# Patient Record
Sex: MALE | Race: WHITE | Employment: FULL TIME | URBAN - METROPOLITAN AREA
[De-identification: names, ages, dates, MRNs, and addresses within clinical notes are randomized per-mention and may not be internally consistent; named-entity substitution may affect disease eponyms.]

---

## 2019-05-23 ENCOUNTER — APPOINTMENT (OUTPATIENT)
Dept: GENERAL RADIOLOGY | Age: 34
End: 2019-05-23
Payer: COMMERCIAL

## 2019-05-23 ENCOUNTER — HOSPITAL ENCOUNTER (EMERGENCY)
Age: 34
Discharge: HOME | End: 2019-05-23
Attending: EMERGENCY MEDICINE
Payer: COMMERCIAL

## 2019-05-23 VITALS
TEMPERATURE: 98.2 F | BODY MASS INDEX: 49.44 KG/M2 | WEIGHT: 315 LBS | RESPIRATION RATE: 18 BRPM | HEART RATE: 94 BPM | SYSTOLIC BLOOD PRESSURE: 140 MMHG | OXYGEN SATURATION: 98 % | HEIGHT: 67 IN | DIASTOLIC BLOOD PRESSURE: 77 MMHG
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
## 2019-05-23 DIAGNOSIS — S16.1XXA STRAIN OF NECK MUSCLE, INITIAL ENCOUNTER: ICD-10-CM

## 2019-05-23 PROCEDURE — 99284 EMERGENCY DEPT VISIT MOD MDM: CPT

## 2019-05-23 PROCEDURE — 6360000002 HC RX W HCPCS: Performed by: NURSE PRACTITIONER

## 2019-05-23 PROCEDURE — 96372 THER/PROPH/DIAG INJ SC/IM: CPT

## 2019-05-23 PROCEDURE — 71046 X-RAY EXAM CHEST 2 VIEWS: CPT

## 2019-05-23 RX ORDER — CYCLOBENZAPRINE HCL 10 MG
10 TABLET ORAL 3 TIMES DAILY PRN
Qty: 30 TABLET | Refills: 0 | Status: SHIPPED | OUTPATIENT
Start: 2019-05-23 | End: 2019-06-02

## 2019-05-23 RX ORDER — IBUPROFEN 800 MG/1
800 TABLET ORAL EVERY 6 HOURS PRN
Qty: 21 TABLET | Refills: 0 | Status: SHIPPED | OUTPATIENT
Start: 2019-05-23

## 2019-05-23 RX ORDER — KETOROLAC TROMETHAMINE 30 MG/ML
60 INJECTION, SOLUTION INTRAMUSCULAR; INTRAVENOUS ONCE
Status: COMPLETED | OUTPATIENT
Start: 2019-05-23 | End: 2019-05-23

## 2019-05-23 RX ADMIN — KETOROLAC TROMETHAMINE 60 MG: 30 INJECTION, SOLUTION INTRAMUSCULAR at 12:28

## 2019-05-23 ASSESSMENT — PAIN SCALES - GENERAL
PAINLEVEL_OUTOF10: 5
PAINLEVEL_OUTOF10: 7

## 2019-05-23 ASSESSMENT — PAIN DESCRIPTION - ORIENTATION: ORIENTATION: LEFT

## 2019-05-23 ASSESSMENT — PAIN DESCRIPTION - PAIN TYPE: TYPE: ACUTE PAIN

## 2019-05-23 ASSESSMENT — PAIN DESCRIPTION - LOCATION: LOCATION: NECK;SHOULDER

## 2019-05-23 ASSESSMENT — PAIN DESCRIPTION - DESCRIPTORS: DESCRIPTORS: ACHING;SHARP;SHOOTING
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
## 2019-05-25 ASSESSMENT — ENCOUNTER SYMPTOMS
VOMITING: 0
ABDOMINAL PAIN: 0
COUGH: 0
CONSTIPATION: 0
WHEEZING: 0
RHINORRHEA: 0
SINUS PRESSURE: 0
COLOR CHANGE: 0
SHORTNESS OF BREATH: 0
NAUSEA: 0
DIARRHEA: 0
SORE THROAT: 0
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
## 2021-06-11 ENCOUNTER — APPOINTMENT (OUTPATIENT)
Dept: GENERAL RADIOLOGY | Facility: CLINIC | Age: 36
End: 2021-06-11
Payer: COMMERCIAL

## 2021-06-11 ENCOUNTER — HOSPITAL ENCOUNTER (EMERGENCY)
Facility: CLINIC | Age: 36
Discharge: HOME | End: 2021-06-11
Attending: EMERGENCY MEDICINE
Payer: COMMERCIAL

## 2021-06-11 VITALS
SYSTOLIC BLOOD PRESSURE: 153 MMHG | OXYGEN SATURATION: 97 % | WEIGHT: 315 LBS | RESPIRATION RATE: 10 BRPM | HEIGHT: 68 IN | DIASTOLIC BLOOD PRESSURE: 80 MMHG | HEART RATE: 91 BPM | BODY MASS INDEX: 47.74 KG/M2

## 2021-06-11 DIAGNOSIS — S93.402A SPRAIN OF LEFT ANKLE, UNSPECIFIED LIGAMENT, INITIAL ENCOUNTER: Primary | ICD-10-CM

## 2021-06-11 PROCEDURE — 96372 THER/PROPH/DIAG INJ SC/IM: CPT

## 2021-06-11 PROCEDURE — 99283 EMERGENCY DEPT VISIT LOW MDM: CPT

## 2021-06-11 PROCEDURE — 6360000002 HC RX W HCPCS: Performed by: EMERGENCY MEDICINE

## 2021-06-11 PROCEDURE — 73610 X-RAY EXAM OF ANKLE: CPT

## 2021-06-11 RX ORDER — PREDNISONE 10 MG/1
10 TABLET ORAL SEE ADMIN INSTRUCTIONS
Qty: 17 TABLET | Refills: 0 | Status: SHIPPED | OUTPATIENT
Start: 2021-06-11 | End: 2021-06-17

## 2021-06-11 RX ORDER — KETOROLAC TROMETHAMINE 10 MG/1
10 TABLET, FILM COATED ORAL EVERY 6 HOURS PRN
Qty: 20 TABLET | Refills: 0 | Status: SHIPPED | OUTPATIENT
Start: 2021-06-11

## 2021-06-11 RX ORDER — KETOROLAC TROMETHAMINE 30 MG/ML
30 INJECTION, SOLUTION INTRAMUSCULAR; INTRAVENOUS ONCE
Status: COMPLETED | OUTPATIENT
Start: 2021-06-11 | End: 2021-06-11

## 2021-06-11 RX ADMIN — KETOROLAC TROMETHAMINE 30 MG: 30 INJECTION, SOLUTION INTRAMUSCULAR; INTRAVENOUS at 18:30

## 2021-06-11 ASSESSMENT — PAIN DESCRIPTION - ORIENTATION: ORIENTATION: RIGHT

## 2021-06-11 ASSESSMENT — PAIN SCALES - GENERAL
PAINLEVEL_OUTOF10: 4
PAINLEVEL_OUTOF10: 4

## 2021-06-11 ASSESSMENT — PAIN DESCRIPTION - LOCATION: LOCATION: ANKLE
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
## 2021-10-13 ENCOUNTER — HOSPITAL ENCOUNTER (EMERGENCY)
Facility: CLINIC | Age: 36
Discharge: HOME | End: 2021-10-13
Attending: EMERGENCY MEDICINE
Payer: COMMERCIAL

## 2021-10-13 ENCOUNTER — APPOINTMENT (OUTPATIENT)
Dept: GENERAL RADIOLOGY | Facility: CLINIC | Age: 36
End: 2021-10-13
Payer: COMMERCIAL

## 2021-10-13 VITALS
HEIGHT: 67 IN | BODY MASS INDEX: 49.44 KG/M2 | WEIGHT: 315 LBS | HEART RATE: 89 BPM | OXYGEN SATURATION: 99 % | RESPIRATION RATE: 16 BRPM | TEMPERATURE: 97.4 F | SYSTOLIC BLOOD PRESSURE: 120 MMHG | DIASTOLIC BLOOD PRESSURE: 73 MMHG

## 2021-10-13 DIAGNOSIS — S93.401A SPRAIN OF RIGHT ANKLE, UNSPECIFIED LIGAMENT, INITIAL ENCOUNTER: Primary | ICD-10-CM

## 2021-10-13 PROCEDURE — 73610 X-RAY EXAM OF ANKLE: CPT

## 2021-10-13 PROCEDURE — 99283 EMERGENCY DEPT VISIT LOW MDM: CPT

## 2021-10-13 ASSESSMENT — ENCOUNTER SYMPTOMS
SHORTNESS OF BREATH: 0
EYE REDNESS: 0
COUGH: 0
VOMITING: 0
NAUSEA: 0
EYE DISCHARGE: 0
SORE THROAT: 0
ABDOMINAL PAIN: 0

## 2021-10-13 NOTE — LETTER
[facility] ED  [address]  Phone: [phone]               October 14, 2021    Patient: Swapnil Watts   YOB: 1985   Date of Visit: 10/13/2021       To Whom It May Concern:    Swapnil Watts was seen and treated in our emergency department on 10/13/2021. He may return to work on 10/14/2021.       Sincerely,       Rimma Mckenzie RN         Signature:__________________________________

## 2021-10-13 NOTE — ED PROVIDER NOTES
Emergency Department           COMPLAINT       Chief Complaint   Patient presents with    Foot Pain     2 days right foot pain       PHYSICAL EXAM      ED Triage Vitals   BP Temp Temp src Pulse Resp SpO2 Height Weight   10/13/21 1841 10/13/21 1839 -- 10/13/21 1841 10/13/21 1839 10/13/21 1839 10/13/21 1839 10/13/21 1839   (!) 130/98 97.4 °F (36.3 °C)  81 17 97 % 5' 7\" (1.702 m) (!) 350 lb (158.8 kg)       Constitutional: Alert, oriented x3, nontoxic, answering questions appropriately, acting properly for age, in no acute distress   HEENT: Extraocular muscles intact,   Neck: Trachea midline,  Musculoskeletal: Right lower extremity no pain at the knee no fibular head tenderness. There is pain to the lateral malleolus just inferior. Drawer negative. Pedal pulses intact and capillary refill less than 2 seconds. No tenderness over the foot metatarsal head. Neurologic: Right lower extremity motor and sensory intact. Skin: Warm and dry     Physical Exam  DIAGNOSTIC RESULTS     EKG: All EKG's are interpreted by the Emergency Department Physician who either signs or Co-signs this chart in the absence of a cardiologist.    Not indicated unless otherwise documented above or in the midlevel documentation    LABS:  No results found for this visit on 10/13/21. Not indicated unless otherwise documented above or in the midlevel documentation    RADIOLOGY:   I reviewedthe radiologist interpretations:  XR ANKLE RIGHT (MIN 3 VIEWS)    (Results Pending)       Not indicated unless otherwise documented above or in the midlevel documentation    EMERGENCY DEPARTMENT COURSE:       PERTINENT ATTENDING PHYSICIAN COMMENTS:    3 days of right-sided ankle pain. 350 pounds works in [employer] on concrete all day. Denies specific injury. X-ray. 7:15 PM care transferred    Faculty Attestation    I performed a history and physical examination of the patient and discussed management with the mid level provideer.  I reviewed

## 2021-10-13 NOTE — ED PROVIDER NOTES
[facility]  [address] [facility]  Phone: [phone]      Pt Name: Faraz Guerrero  MRN: [id number]  [place] 1985  Date of evaluation: 10/13/2021      CHIEF COMPLAINT       Chief Complaint   Patient presents with    Foot Pain     2 days right foot pain        HISTORY OF PRESENT ILLNESS   (Location, Quality, Severity, Duration, Timing, Context, ModifyingFactors, Associated Signs and Symptoms)     Faraz Guerrero is a 31 y.o. male who presents to the ER for evaluation of right ankle pain. Patient states that he has had pain over the past 2 days. He was seen in this ER approximately 4 months ago with similar pain. Patient states that he is up walking on concrete 10 hours a day. The pain seems to be most severe at nighttime. Patient denies acute injury to the ankle, but states that when he walks sometimes he does roll his ankle. He rates his acute pain at 3/10. Patient has not taken any medication for his discomfort. He has not applied ice to the affected area. No prior history of gout. Nursing Notes were reviewed. REVIEW OF SYSTEMS     (2-9 systems for level 4, 10 or more for level 5)    Review of Systems   Constitutional: Negative for chills and fever. HENT: Negative for congestion, ear discharge and sore throat. Eyes: Negative for discharge and redness. Respiratory: Negative for cough and shortness of breath. Cardiovascular: Negative for chest pain. Gastrointestinal: Negative for abdominal pain, nausea and vomiting. Genitourinary: Negative for dysuria and frequency. Musculoskeletal: Negative for neck pain. Right ankle pain. Skin: Negative for rash. Neurological: Negative for seizures and syncope. All other systems reviewed and are negative. PAST MEDICAL HISTORY   Obesity. SURGICAL HISTORY      has a past surgical history that includes Leg Surgery.     CURRENT MEDICATIONS       Previous Medications    IBUPROFEN (IBU) 800 MG TABLET Take 1 tablet by mouth every 6 hours as needed for Pain    KETOROLAC (TORADOL) 10 MG TABLET    Take 1 tablet by mouth every 6 hours as needed for Pain     ALLERGIES     is allergic to tetanus toxoids. FAMILY HISTORY     has no family status information on file. family history is not on file. SOCIAL HISTORY      reports that he has never smoked. He has never used smokeless tobacco. He reports current alcohol use. He reports that he does not use drugs. PHYSICAL EXAM     (7 for level 4, 8 or more for level 5)    ED Triage Vitals   BP Temp Temp src Pulse Resp SpO2 Height Weight   10/13/21 1841 10/13/21 1839 -- 10/13/21 1841 10/13/21 1839 10/13/21 1839 10/13/21 1839 10/13/21 1839   (!) 130/98 97.4 °F (36.3 °C)  81 17 97 % 5' 7\" (1.702 m) (!) 350 lb (158.8 kg)     Physical Exam  Vitals reviewed. Constitutional:       Comments: Morbidly obese. HENT:      Head: Normocephalic and atraumatic. Eyes:      General: No scleral icterus. Cardiovascular:      Rate and Rhythm: Normal rate. Pulmonary:      Effort: Pulmonary effort is normal.   Musculoskeletal:      Right ankle: Swelling present. No deformity. Tenderness present over the lateral malleolus. No base of 5th metatarsal or proximal fibula tenderness. Normal range of motion. Normal pulse. Feet:    Skin:     General: Skin is warm and dry. Neurological:      General: No focal deficit present. Mental Status: He is alert. Psychiatric:         Mood and Affect: Mood normal.         Behavior: Behavior normal.       DIAGNOSTIC RESULTS     RADIOLOGY:   Radiology images were visualized by myself. The Radiologist interpretations were reviewed and are as follows:  XR ANKLE RIGHT (MIN 3 VIEWS) (Final result)  Result time 10/13/21 19:25:24  Final result by David Elizabeth MD (10/13/21 19:25:24)                Impression:    No acute osseous abnormality of the ankle.              Narrative:    EXAMINATION:   THREE XRAY VIEWS OF THE RIGHT ANKLE 10/13/2021 6:54 pm     COMPARISON:   06/11/2021     HISTORY:   ORDERING SYSTEM PROVIDED HISTORY: ankle pain x 2 days   Reason for Exam: Right lateral ankle pain x2 weeks. No acute trauma, no   surgery. Acuity: Acute   Type of Exam: Initial     FINDINGS:   No evidence of acute fracture or dislocation.  Normal alignment of the ankle   mortise.  Stable prominent calcaneal spurring.  No focal osseous lesion.  No   evidence of joint effusion.  Stable generalized soft tissue prominence.                   LABS:  No results found for this visit on 10/13/21. MDM:   Patient presents to the ER for evaluation of right lateral ankle pain. Patient has had no known injury. Pain in the ankle has been going on for 2 days. Pain seems to be worse at nighttime. Patient states that he walks on concrete 10 hours a day. No prior history of gout. There has been no redness or warmth to the affected area. Patient has noted swelling. I will obtain a plain film x-ray of the right ankle to evaluate for acute pathology. EMERGENCY DEPARTMENT COURSE:   Vitals:    Vitals:    10/13/21 1839 10/13/21 1841   BP:  (!) 130/98   Pulse:  81   Resp: 17    Temp: 97.4 °F (36.3 °C)    SpO2: 97%    Weight: (!) 158.8 kg (350 lb)    Height: 5' 7\" (1.702 m)      -------------------------  BP: (!) 130/98, Temp: 97.4 °F (36.3 °C), Pulse: 81, Resp: 17    The patient was given the following medications:  No orders of the defined types were placed in this encounter. PROCEDURES  An Ace wrap was applied to the right ankle by the nursing staff. I have examined the Ace wrap for proper placement. The right lower extremity is neurovascularly intact following placement of the Ace wrap. Re-evaluation Notes  7:36 PM.  Results of the right ankle x-ray were discussed with the patient by myself. X-ray is showing no evidence of acute fracture or dislocation. Patient has normal alignment of the ankle mortise.   I feel that he is most likely suffering from a sprain. An Ace wrap will be applied to the ankle. Patient will be provided rehab exercises to help with the ankle sprain. FINAL IMPRESSION      1. Sprain of right ankle, unspecified ligament, initial encounter        DISPOSITION/PLAN   DISPOSITION      Condition on Disposition  Stable    PATIENT REFERRED TO:  No follow-up provider specified.     DISCHARGE MEDICATIONS:  New Prescriptions    No medications on file     (Please note that portions of this note were completed with a voice recognition program.  Efforts were made to edit the dictations but occasionally words are mis-transcribed.)    Jocelin Story PA-C  10/13/21 2005

## 2024-10-21 ENCOUNTER — HOSPITAL ENCOUNTER (EMERGENCY)
Facility: CLINIC | Age: 39
Discharge: HOME | End: 2024-10-21
Attending: EMERGENCY MEDICINE
Payer: COMMERCIAL

## 2024-10-21 ENCOUNTER — APPOINTMENT (OUTPATIENT)
Dept: GENERAL RADIOLOGY | Facility: CLINIC | Age: 39
End: 2024-10-21
Payer: COMMERCIAL

## 2024-10-21 ENCOUNTER — APPOINTMENT (OUTPATIENT)
Dept: CT IMAGING | Facility: CLINIC | Age: 39
End: 2024-10-21
Payer: COMMERCIAL

## 2024-10-21 VITALS
RESPIRATION RATE: 20 BRPM | WEIGHT: 315 LBS | BODY MASS INDEX: 46.65 KG/M2 | HEART RATE: 99 BPM | HEIGHT: 69 IN | TEMPERATURE: 98.6 F | DIASTOLIC BLOOD PRESSURE: 94 MMHG | SYSTOLIC BLOOD PRESSURE: 158 MMHG | OXYGEN SATURATION: 98 %

## 2024-10-21 DIAGNOSIS — S22.31XA CLOSED FRACTURE OF ONE RIB OF RIGHT SIDE, INITIAL ENCOUNTER: Primary | ICD-10-CM

## 2024-10-21 PROCEDURE — 6360000002 HC RX W HCPCS: Performed by: EMERGENCY MEDICINE

## 2024-10-21 PROCEDURE — 71250 CT THORAX DX C-: CPT

## 2024-10-21 PROCEDURE — 71046 X-RAY EXAM CHEST 2 VIEWS: CPT

## 2024-10-21 PROCEDURE — 99284 EMERGENCY DEPT VISIT MOD MDM: CPT

## 2024-10-21 PROCEDURE — 96372 THER/PROPH/DIAG INJ SC/IM: CPT

## 2024-10-21 PROCEDURE — 6370000000 HC RX 637 (ALT 250 FOR IP): Performed by: EMERGENCY MEDICINE

## 2024-10-21 RX ORDER — LIDOCAINE 4 G/G
1 PATCH TOPICAL ONCE
Status: DISCONTINUED | OUTPATIENT
Start: 2024-10-21 | End: 2024-10-21 | Stop reason: HOSPADM

## 2024-10-21 RX ORDER — CYCLOBENZAPRINE HCL 10 MG
10 TABLET ORAL 3 TIMES DAILY PRN
Qty: 21 TABLET | Refills: 0 | Status: SHIPPED | OUTPATIENT
Start: 2024-10-21 | End: 2024-10-31

## 2024-10-21 RX ORDER — OXYCODONE AND ACETAMINOPHEN 5; 325 MG/1; MG/1
1 TABLET ORAL EVERY 8 HOURS PRN
Qty: 7 TABLET | Refills: 0 | Status: SHIPPED | OUTPATIENT
Start: 2024-10-21 | End: 2024-10-28

## 2024-10-21 RX ORDER — LIDOCAINE 4 G/G
1 PATCH TOPICAL DAILY
Qty: 30 PATCH | Refills: 0 | Status: SHIPPED | OUTPATIENT
Start: 2024-10-21 | End: 2024-11-20

## 2024-10-21 RX ORDER — KETOROLAC TROMETHAMINE 30 MG/ML
30 INJECTION, SOLUTION INTRAMUSCULAR; INTRAVENOUS ONCE
Status: COMPLETED | OUTPATIENT
Start: 2024-10-21 | End: 2024-10-21

## 2024-10-21 RX ADMIN — KETOROLAC TROMETHAMINE 30 MG: 30 INJECTION, SOLUTION INTRAMUSCULAR at 03:34

## 2024-10-21 ASSESSMENT — PAIN SCALES - GENERAL
PAINLEVEL_OUTOF10: 4
PAINLEVEL_OUTOF10: 10

## 2024-10-21 ASSESSMENT — PAIN - FUNCTIONAL ASSESSMENT: PAIN_FUNCTIONAL_ASSESSMENT: 0-10

## 2024-10-21 NOTE — DISCHARGE INSTRUCTIONS
Call today or tomorrow to follow up with Payton Gallardo APRN - CNP  in 2-3 days.    Take your medication as prescribed for pain.  Use the incentive spirometer every hour while awake.  Do not use any rib binders / ace wraps around your chest.    Return to the Emergency Department for worsening of pain not controlled with medication, shortness of breath, chest pain, any other care or concern.

## 2024-10-21 NOTE — ED NOTES
Pt arrives via private auto, ambulatory to room. C/O R side rib pain for past month. Pt states he injured his rib while coughing. Pt reports pain has increased over past 2 days. Pt reports taking ibuprofen approx 6 hrs ago. Pt A/Ox4

## 2024-10-21 NOTE — ED PROVIDER NOTES
[facility] ED  [address] [facility] [address]  Phone: [phone]      Pt Name: Roberto Garrett Jr.  MRN:[id number]  Birthdate 1985  Date of evaluation: 10/21/2024      CHIEF COMPLAINT       Chief Complaint   Patient presents with    Rib Pain (injury)     R side       HISTORY OF PRESENT ILLNESS   Roberto Garrett Jr. is a 39 y.o. male who presents for evaluation of right-sided rib pain.  The patient reports that approximately 1 month ago after a URI he was coughing and felt a sudden, sharp, stabbing, \"pop\" in his right side ribs with associated pain.  He states that he saw his PCP afterwards and she suspected that he had a rib fracture.  He states that his rib pain was slowly getting better with ibuprofen until a few days ago when he coughed again and felt worsening pain.  The patient states that his pain is worse with movement and better at rest.  He does not list any other provoking or palliating factors.  He denies any direct trauma to his chest.  The patient denies fever, chills, headache, vision changes, neck pain, back pain, shortness of breath, abdominal pain, urinary/bowel symptoms, focal weakness, numbness, tingling, recent travel, recent surgery, hemoptysis, estrogen use, calf tenderness, calf swelling, history of CAD or blood clots, recent injury or falls.    REVIEW OF SYSTEMS     Positive: Right-sided rib pain  Ten point review of systems was reviewed and is negative unless otherwise noted in the HPI    PAST MEDICAL HISTORY    has no past medical history on file.  Patient denies    SURGICAL HISTORY      has a past surgical history that includes Leg Surgery.    CURRENT MEDICATIONS       Previous Medications    IBUPROFEN (IBU) 800 MG TABLET    Take 1 tablet by mouth every 6 hours as needed for Pain    KETOROLAC (TORADOL) 10 MG TABLET    Take 1 tablet by mouth every 6 hours as needed for Pain       ALLERGIES     is allergic to tetanus toxoids.    FAMILY HISTORY     has no family status

## 2025-04-06 ENCOUNTER — HOSPITAL ENCOUNTER (EMERGENCY)
Facility: CLINIC | Age: 40
Discharge: HOME | End: 2025-04-06
Attending: EMERGENCY MEDICINE
Payer: COMMERCIAL

## 2025-04-06 VITALS
SYSTOLIC BLOOD PRESSURE: 162 MMHG | WEIGHT: 315 LBS | HEART RATE: 104 BPM | OXYGEN SATURATION: 99 % | TEMPERATURE: 98.4 F | RESPIRATION RATE: 16 BRPM | HEIGHT: 69 IN | BODY MASS INDEX: 46.65 KG/M2 | DIASTOLIC BLOOD PRESSURE: 99 MMHG

## 2025-04-06 DIAGNOSIS — I89.0 LYMPHEDEMA OF LEFT LEG: ICD-10-CM

## 2025-04-06 DIAGNOSIS — S81.802A OPEN LEG WOUND, LEFT, INITIAL ENCOUNTER: ICD-10-CM

## 2025-04-06 DIAGNOSIS — I82.90 CLOT: Primary | ICD-10-CM

## 2025-04-06 LAB
ANION GAP SERPL CALCULATED.3IONS-SCNC: 13 MMOL/L (ref 9–16)
BASOPHILS # BLD: 0 K/UL (ref 0–0.2)
BASOPHILS NFR BLD: 0 % (ref 0–2)
BUN SERPL-MCNC: 14 MG/DL (ref 6–20)
CALCIUM SERPL-MCNC: 9.1 MG/DL (ref 8.6–10.4)
CHLORIDE SERPL-SCNC: 96 MMOL/L (ref 98–107)
CO2 SERPL-SCNC: 23 MMOL/L (ref 20–31)
CREAT SERPL-MCNC: 0.7 MG/DL (ref 0.7–1.2)
EOSINOPHIL # BLD: 0.1 K/UL (ref 0–0.4)
EOSINOPHILS RELATIVE PERCENT: 1 % (ref 1–4)
ERYTHROCYTE [DISTWIDTH] IN BLOOD BY AUTOMATED COUNT: 14.9 % (ref 12.5–15.4)
GFR, ESTIMATED: >90 ML/MIN/1.73M2
GLUCOSE SERPL-MCNC: 372 MG/DL (ref 74–99)
HCT VFR BLD AUTO: 39.7 % (ref 41–53)
HGB BLD-MCNC: 13.5 G/DL (ref 13.5–17.5)
INR PPP: 1
LYMPHOCYTES NFR BLD: 3.3 K/UL (ref 1–4.8)
LYMPHOCYTES RELATIVE PERCENT: 25 % (ref 24–44)
MCH RBC QN AUTO: 28.2 PG (ref 26–34)
MCHC RBC AUTO-ENTMCNC: 34.1 G/DL (ref 31–37)
MCV RBC AUTO: 82.7 FL (ref 80–100)
MONOCYTES NFR BLD: 0.5 K/UL (ref 0.1–1.2)
MONOCYTES NFR BLD: 4 % (ref 2–11)
NEUTROPHILS NFR BLD: 70 % (ref 36–66)
NEUTS SEG NFR BLD: 9.1 K/UL (ref 1.8–7.7)
PARTIAL THROMBOPLASTIN TIME: 25.3 SEC (ref 21.3–31.3)
PLATELET # BLD AUTO: 206 K/UL (ref 140–450)
PMV BLD AUTO: 9.4 FL (ref 6–12)
POTASSIUM SERPL-SCNC: 3.7 MMOL/L (ref 3.7–5.3)
PROTHROMBIN TIME: 9.9 SEC (ref 9.4–12.6)
RBC # BLD AUTO: 4.8 M/UL (ref 4.5–5.9)
SODIUM SERPL-SCNC: 132 MMOL/L (ref 136–145)
WBC OTHER # BLD: 13 K/UL (ref 3.5–11)

## 2025-04-06 PROCEDURE — 6370000000 HC RX 637 (ALT 250 FOR IP): Performed by: EMERGENCY MEDICINE

## 2025-04-06 PROCEDURE — 85610 PROTHROMBIN TIME: CPT

## 2025-04-06 PROCEDURE — 85025 COMPLETE CBC W/AUTO DIFF WBC: CPT

## 2025-04-06 PROCEDURE — 80048 BASIC METABOLIC PNL TOTAL CA: CPT

## 2025-04-06 PROCEDURE — 36415 COLL VENOUS BLD VENIPUNCTURE: CPT

## 2025-04-06 PROCEDURE — 85730 THROMBOPLASTIN TIME PARTIAL: CPT

## 2025-04-06 PROCEDURE — 99283 EMERGENCY DEPT VISIT LOW MDM: CPT

## 2025-04-06 RX ORDER — SULFAMETHOXAZOLE AND TRIMETHOPRIM 800; 160 MG/1; MG/1
1 TABLET ORAL ONCE
Status: COMPLETED | OUTPATIENT
Start: 2025-04-06 | End: 2025-04-06

## 2025-04-06 RX ORDER — CEFUROXIME AXETIL 500 MG/1
500 TABLET ORAL 2 TIMES DAILY
Qty: 20 TABLET | Refills: 0 | Status: SHIPPED | OUTPATIENT
Start: 2025-04-06 | End: 2025-04-16

## 2025-04-06 RX ORDER — CEPHALEXIN 500 MG/1
500 CAPSULE ORAL ONCE
Status: COMPLETED | OUTPATIENT
Start: 2025-04-06 | End: 2025-04-06

## 2025-04-06 RX ORDER — SULFAMETHOXAZOLE AND TRIMETHOPRIM 800; 160 MG/1; MG/1
1 TABLET ORAL 2 TIMES DAILY
Qty: 20 TABLET | Refills: 0 | Status: SHIPPED | OUTPATIENT
Start: 2025-04-06 | End: 2025-04-16

## 2025-04-06 RX ORDER — GINSENG 100 MG
CAPSULE ORAL ONCE
Status: CANCELLED | OUTPATIENT
Start: 2025-04-06

## 2025-04-06 RX ADMIN — CEPHALEXIN 500 MG: 500 CAPSULE ORAL at 02:42

## 2025-04-06 RX ADMIN — SULFAMETHOXAZOLE AND TRIMETHOPRIM 1 TABLET: 800; 160 TABLET ORAL at 02:42

## 2025-04-06 RX ADMIN — APIXABAN 10 MG: 5 TABLET, FILM COATED ORAL at 02:35

## 2025-04-06 ASSESSMENT — PAIN - FUNCTIONAL ASSESSMENT: PAIN_FUNCTIONAL_ASSESSMENT: NONE - DENIES PAIN

## 2025-04-06 NOTE — ED NOTES
Pt arrives via private auto, ambulatory to room. Has concerns for wound infection in L leg. Started 1 week ago. Pt currently denies pain, but states hurt more while at work today.

## 2025-04-06 NOTE — DISCHARGE INSTRUCTIONS
- Get ultrasound of legs scheduled for Monday.  Call your primary care provider to get results.  -Continue the blood thinner until you hear back from your primary care provider about the results.  -Take 2 pills morning and evening until you are told you can stop.  If you do have a blood clot you will be prescribed additional medication by your primary care provider.  -Take the antibiotic, both of them until finished.  -Clean your wound daily with soap and water apply triple antibiotic and keep it covered until healed.  -Your legs are chronically swollen and it could be related to your job or your weight.  Call the vascular surgeon whose number have included for follow-up.  He can help you with the swelling and he will prevent future infections and problems.  -Go to nearest ER for any worsening signs of infection or fevers chills or new concerns or symptoms.

## 2025-04-06 NOTE — ED PROVIDER NOTES
[facility]  [address] [address]  Phone: [phone]      Patient Name:  Roberto Garrett Jr.  Medical Record Number:  [id number]  YOB: 1985  Date of Service:  4/6/2025  Primary Care Physician:  Payton Gallardo APRN - CNP      CHIEF COMPLAINT:       Chief Complaint   Patient presents with    Wound Check     Left leg       HISTORY OF PRESENT ILLNESS:    Roberto Garrett Jr. is a 40 y.o. male who presents with the complaint of leg wound    40-year-old male with progressing wound on the back of his left lower leg x 1 week.  He also feels like his left lower leg is swelling.  Denies any calf pain.  Without any chest pain shortness of breath.  No fevers chills.  Denies any history of MRSA infection.  However has had history of trauma to his right leg and does get infections from time to time there.  He has been busy at work and has not had a chance to get medical evaluation.  Denies history of diabetes.  He has noticed some sores with drainage on the back of his leg.  No history of DVT or PE.    CURRENT MEDICATIONS:      Previous Medications    IBUPROFEN (IBU) 800 MG TABLET    Take 1 tablet by mouth every 6 hours as needed for Pain    KETOROLAC (TORADOL) 10 MG TABLET    Take 1 tablet by mouth every 6 hours as needed for Pain       ALLERGIES:   is allergic to tetanus toxoids.    PAST MEDICAL HISTORY:    has no past medical history on file.    SURGICAL HISTORY:      has a past surgical history that includes Leg Surgery.    FAMILY HISTORY:   has no family status information on file.      family history is not on file.    SOCIAL HISTORY:     reports that he has been smoking cigarettes. He has never used smokeless tobacco. He reports current alcohol use. He reports that he does not use drugs.    IMMUNIZATION HISTORY:      There is no immunization history on file for this patient.    PHYSICAL EXAM:     Initial Vital Signs:  height is 1.753 m (5' 9\") and weight is 181.4 kg (400